# Patient Record
Sex: MALE | Race: WHITE | Employment: FULL TIME | URBAN - METROPOLITAN AREA
[De-identification: names, ages, dates, MRNs, and addresses within clinical notes are randomized per-mention and may not be internally consistent; named-entity substitution may affect disease eponyms.]

---

## 2020-02-04 ENCOUNTER — OFFICE VISIT (OUTPATIENT)
Dept: ORTHOPEDIC SURGERY | Age: 65
End: 2020-02-04
Payer: COMMERCIAL

## 2020-02-04 VITALS
HEIGHT: 68 IN | DIASTOLIC BLOOD PRESSURE: 76 MMHG | WEIGHT: 185 LBS | HEART RATE: 68 BPM | BODY MASS INDEX: 28.04 KG/M2 | SYSTOLIC BLOOD PRESSURE: 151 MMHG

## 2020-02-04 PROBLEM — N52.8 OTHER MALE ERECTILE DYSFUNCTION: Status: ACTIVE | Noted: 2019-07-31

## 2020-02-04 PROCEDURE — 99203 OFFICE O/P NEW LOW 30 MIN: CPT | Performed by: ORTHOPAEDIC SURGERY

## 2020-02-04 PROCEDURE — 20610 DRAIN/INJ JOINT/BURSA W/O US: CPT | Performed by: ORTHOPAEDIC SURGERY

## 2020-02-04 RX ORDER — MELOXICAM 15 MG/1
15 TABLET ORAL DAILY PRN
Qty: 30 TABLET | Refills: 2 | Status: SHIPPED | OUTPATIENT
Start: 2020-02-04

## 2020-02-04 RX ORDER — NIACIN 1000 MG
TABLET, EXTENDED RELEASE ORAL
COMMUNITY
Start: 2013-03-15

## 2020-02-04 RX ORDER — SILDENAFIL 50 MG/1
50 TABLET, FILM COATED ORAL
COMMUNITY
Start: 2020-01-30

## 2020-02-04 RX ORDER — LISINOPRIL 20 MG/1
10 TABLET ORAL DAILY
COMMUNITY
Start: 2019-02-14

## 2020-02-04 RX ORDER — GLUCOSAMINE SULFATE 500 MG
1500 CAPSULE ORAL
COMMUNITY

## 2020-02-04 RX ORDER — METHYLPREDNISOLONE ACETATE 40 MG/ML
80 INJECTION, SUSPENSION INTRA-ARTICULAR; INTRALESIONAL; INTRAMUSCULAR; SOFT TISSUE ONCE
Status: COMPLETED | OUTPATIENT
Start: 2020-02-04 | End: 2020-02-04

## 2020-02-04 RX ADMIN — METHYLPREDNISOLONE ACETATE 80 MG: 40 INJECTION, SUSPENSION INTRA-ARTICULAR; INTRALESIONAL; INTRAMUSCULAR; SOFT TISSUE at 15:38

## 2020-02-04 NOTE — LETTER
Physical Therapy Rehabilitation Referral    Patient Name:  Gilda Connell      YOB: 1955    Diagnosis:    1. Adhesive capsulitis of right shoulder    2. Right shoulder pain, unspecified chronicity        Precautions: No strengthening yet. [x] Evaluate and Treat    Post Op Instructions:  [] Continuous passive motion (CPM) [] Elbow ROM  [x] Exercise in plane of scapula  []  Strengthening     [] Pulley and instruction   [x] Home exercise program (copy to patient)   [] Sling when arm at risk  [] Sling or brace at all times   [] AAROM: Forward elevation to  140            [] AAROM: External rotation  To  40    [] Isometric external rotator strengthening [] AAROM: internal rotation: up the back  [] Isometric abductor strengthening  [] AAROM: Internal abduction   [] Isometric internal rotator strengthening [] AAROM: cross-body adduction             Stretching:     Strengthening:  [x] Four quadrant (FE, ER, IR, CBA)  [] Rotator cuff (ER, IR, Abd)  [] Forward Elevation    [] External Rotators     [] External Rotation    [] Internal Rotators  [] Internal Rotation: up/back   [] Abductors     [] Internal Rotation: supine in abduction  [] Sleeper Stretch    [] Flexors  [] Cross-body abduction    [] Extensors  [] Pendulum (FE, Abd/Add, cw/ccw)  [x] Scapular Stabilizers   [] Wall-walking (FE, Abd)        [x] Shoulder shrugs     [] Table slides (FE)                [x] Rhomboid pinch  [] Elbow (flex, ext, pron, sup)        [] Lat.  Pull downs     [] Medial epicondylitis program       [] Forward punch   [] Lateral epicondylitis program       [] Internal rotators     [] Progressive resistive exercises  [] Bench Press        [] Bench press plus  Activities:     [] Lateral pull-downs  [] Rowing     [] Progressive two-hand supine press  [] Stepper/Exercise bike   [] Biceps: curls/supination  [] Swimming  [] Water exercises    Modalities:     Return to Sport:  [x] Of Choice      [] Plyometrics

## 2020-02-04 NOTE — PROGRESS NOTES
[address] [facility]  Office Visit  RIGHT SHOULDER Pain  Date:  2020    Name:  Catracho Duncan  Address:  [place] [address]    :  1955      Age:   59 y.o.    SSN:  [id number]      Medical Record Number:  [id number]    Chief Complaint:    RIGHT shoulder pain with overhead activity  Frozen Shoulder    HPI:   Catracho Duncan is a 59 y.o. right hand dominant male presenting with right shoulder pain and stiffness. Pain began in 2019 when he was landing several buckets of asphalt across his driveway way. More recently he has noticed pain with overhead and abduction type movements of his right shoulder. He has no major pain at rest.  However he does some have some significant pain at night especially when he rolls on the shoulder but also irregardless of the position. Although there is no pain at rest there is pain with sudden movement especially reaching out to put his jacket on or to throw a seatbelt on. He has no neck pain no numbness paresthesias in the upper extremity. He has had no major prior injuries to his shoulder is had no formal physical therapy. He is tried aspirin, Tylenol, Aleve, and Advil for shoulder but nothing is because any consistent relief. He is had no prior cortisone shots or shoulder. Pain Assessment  Location of Pain: Shoulder  Location Modifiers: Right  Severity of Pain: 0  Quality of Pain: Sharp  Duration of Pain: Persistent  Frequency of Pain: Intermittent  Aggravating Factors:  Other (Comment)(raising arm)  Limiting Behavior: Yes  Result of Injury: No  Work-Related Injury: No  Are there other pain locations you wish to document?: No    Past History:  Past Medical History:   Diagnosis Date    Hyperlipidemia     Hypertension        Past Surgical History:   Procedure Laterality Date    HERNIA REPAIR      as child double       Social History     Tobacco Use    Smoking status: Never Smoker    Smokeless tobacco: Never Used   Substance Use Topics    Alcohol use: Yes    Drug use: Never        Family History:  family history is not on file. Current Outpatient Medications   Medication Sig Dispense Refill    Niacin ER 1000 MG TBCR 2 TABLETS DAILY      sildenafil (VIAGRA) 50 MG tablet Take 50 mg by mouth      Glucosamine 500 MG CAPS Take 1,500 mg by mouth      lisinopril (PRINIVIL;ZESTRIL) 20 MG tablet Take 10 mg by mouth daily       amLODIPine (NORVASC) 5 MG tablet Take 5 mg by mouth daily.  vitamin D 1000 UNITS CAPS Take 1 capsule by mouth daily.  FISH OIL 1,200 capsules by Does not apply route daily.  aspirin 81 MG tablet Take 81 mg by mouth daily. No current facility-administered medications for this visit. Allergies   Allergen Reactions    Statins      ELEVATED LIVER ENZYMES    Zocor [Simvastatin]      Causes elevated LFT         Review of Systems: A 14 point review of systems available in the scanned medical record on 2/4/2020 as documented by the patient. Today's review pertinent items are noted in HPI. Musculoskeletal ROS: positive for - pain in right shoulder      Physical Exam:  BP (!) 151/76   Pulse 68   Ht 5' 8\" (1.727 m)   Wt 185 lb (83.9 kg)   BMI 28.13 kg/m²       General: No acute distress, well nourished  CV: No obvious peripheral edema. Normal peripheral pulses  Neuro: Alert & oriented x 3  Psych: Normal mood and affect      RIGHT Upper Extremity Exam:      FF Abd ER IR   Active  100 30 sacrum   Passive  110 Supine in abduction 25deg Supine in abduction 5 deg   Strength (x/5)  5/5 Jobes  5/5 Champagne toast 5/5 5/5     Skin:  No skin rashes or lesions, warm, well perfused  Inspection: No deformity,  Palpation: TENDER at the biceps tendon, but not at the cuff insertion, nor AC joint, nor caleb-scap musculature. Stability: No instability.    Sensation: Intact in all distributions  Motor: AIN/PIN/U 5/5  Strong radial pulse  Special Tests: Positive Hawkin's Impingement. Negative Biceps, AC joint provocative testing. Load and shift very stiff less than contra-lateral side. Cross body adduction arm to contralateral ACJ distance is 12-14 cm. Contralateral left Upper Extremity Exam:      FF Abd ER IR   Active  175 45 T6   Passive ROM   Supine abduction 90deg Supine abduction 45 deg   Strength (x/5)  5/5 Jobes  5/5 Champagne toast 5/5 5/5     Skin:   No skin rashes or lesions, warm, well perfused  Inspection: No deformity,  Palpation: non tender at biceps, ac joint, nor cuff footprint  Stability: No instability  Sensation: Intact in all distributions  Motor: AIN/PIN/U 5/5  Strong radial pulse  Special Tests: . Load and shift normal grade . Cross body adduction arm to contralateral ACJ distance is 8-10cm    Radiographic:  3 xray views of the right  shoulder including True AP in internal and external and axillary lateral were reviewed and interpreted by me today and show a maintained gleno humeral joint space with no evidence of arthritis, loose body, or fracture. Normal AC joint. Assessment:  Sun Jose is a 59 y.o. male with primary idiopathic adhesive capsulitis of the right shoulder, of close 4 months duration. Impression:  Encounter Diagnosis   Name Primary?     Right shoulder pain, unspecified chronicity Yes       Office Procedures:  Orders Placed This Encounter   Procedures    XR SHOULDER RIGHT (MIN 2 VIEWS)     Standing Status:   Future     Number of Occurrences:   1     Standing Expiration Date:   2/4/2021     Order Specific Question:   Reason for exam:     Answer:   pain    [facility]     Referral Priority:   Routine     Referral Type:   Eval and Treat     Referral Reason:   Specialty Services Required     Requested Specialty:   Physical Therapy     Number of Visits Requested:   1    FL ARTHROCENTESIS ASPIR&/INJ MAJOR JT/BURSA W/O US     80 mg Depomedrol with 8 cc 1% Lidocaine in 10cc syringe with 25G (22G) my attention for an addendum. All efforts were made to ensure that this office note is accurate.       ______________  I was physically present and personally supervised the Orthopaedic Sports Medicine Fellow in the evaluation and development of a treatment plan for this patient. I personally interviewed the patient and performed a physical examination. In addition, I discussed the patient's condition and treatment options with them. I have also reviewed and agree with the past medical, family and social history unless otherwise noted. All of the patient's questions were answered. Ramsey Bee MD, PhD  2/4/2020

## 2020-02-06 ENCOUNTER — HOSPITAL ENCOUNTER (OUTPATIENT)
Dept: PHYSICAL THERAPY | Age: 65
Setting detail: THERAPIES SERIES
Discharge: HOME | End: 2020-02-06
Payer: COMMERCIAL

## 2020-02-06 PROCEDURE — 97161 PT EVAL LOW COMPLEX 20 MIN: CPT

## 2020-02-06 NOTE — PROGRESS NOTES
Outpatient Physical Therapy  [] [facility]    Phone: [phone]   Fax: [phone]   [] [facility]  Phone: [phone]              Fax: [phone]  [x] [facility]   Phone: [phone]   Fax: [phone]     To: Referring Practitioner: Dr. Shaun Luna      Patient: Ede Wells   : 1955   MRN: [id number]  Evaluation Date: 2020      Diagnosis Information:  · Diagnosis: R shoulder pain, unspecified chronicity (M25.511); Adhesive capsulitis of right shoulder (M75.01)   · Treatment Diagnosis: Decreased ADL status     Physical Therapy Certification/Re-Certification Form  Dear Dr. Rommel Oneal,  The following patient has been evaluated for physical therapy services and for therapy to continue, Medicare requires monthly physician review of the treatment plan. Please review the attached evaluation and/or summary of the patient's plan of care, and verify that you agree therapy should continue by signing the attached document and sending it back to our office. Plan of Care/Treatment to date:  [x] Therapeutic Exercise    [x] Modalities:  [] Therapeutic Activity     [] Ultrasound  [] Electrical Stimulation  [] Gait Training      [] Cervical Traction [] Lumbar Traction  [] Neuromuscular Re-education    [] Cold/hotpack [] Iontophoresis   [x] Instruction in HEP     Other:  [x] Manual Therapy      []             [] Aquatic Therapy      []           ? Frequency/Duration:  # Days per week: [] 1 day # Weeks: [] 1 week [] 5 weeks     [x] 2 days? [] 2 weeks [] 6 weeks     [] 3 days   [] 3 weeks [] 7 weeks     [] 4 days   [x] 4 weeks [] 8 weeks    Rehab Potential: [] Excellent [x] Good [] Fair  [] Poor       Electronically signed by:  Precious Werner PT      If you have any questions or concerns, please don't hesitate to call.   Thank you for your referral.      Physician Signature:________________________________Date:__________________  By signing above, therapists plan is approved by physician

## 2020-02-06 NOTE — PROGRESS NOTES
Physical Therapy  Initial Assessment  Date: 2020  Patient Name: Naun Bowen  MRN: [id number]  : 1955     Treatment Diagnosis: Decreased ADL status    Restrictions  Restrictions/Precautions  Restrictions/Precautions: Fall Risk(none)    Subjective   General  Chart Reviewed: Yes  Referring Practitioner: Dr. Ed Mcknight  Referral Date : 20  Diagnosis: R shoulder pain, unspecified chronicity (M25.511); Adhesive capsulitis of right shoulder (M75.01)  General Comment  Comments: Pt has difficulty lifting overhead \"to get something off a shelf. \"  Pt cannot sleep on his left side, cannot swing an ax, \"basically anything where I'm putting my arm up. \"  Pt cannot reach behind the back. Pt has 8/10 pain when reaching extremes of motion. Pt is able to lift items \"as long as I don't raise my arm. \"  PT Visit Information  Onset Date: 10/01/19  PT Insurance Information: BetoJason Arriaga 82 (submitted claim on 20)  Total # of Visits Approved: 12  Total # of Visits to Date: 1  Subjective  Subjective: Pt reported having R shoulder pain and dysfunction since , with incidious onset. Pt stated \"at the time I was working on my asphalt driveway and I was scrubbing it which may have triggered it. \"  Pain is rate 0-8/10.      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

## 2020-02-06 NOTE — FLOWSHEET NOTE
Physical Therapy Daily Treatment Note  Date:  2020    Patient Name:  Wallace Garnica    :  1955  MRN: [id number]    Restrictions/Precautions:  Restrictions/Precautions  Restrictions/Precautions: Fall Risk(none)  Pertinent Medical History:      Medical/Treatment Diagnosis Information:  · Diagnosis: R shoulder pain, unspecified chronicity (M25.511); Adhesive capsulitis of right shoulder (M75.01)  · Treatment Diagnosis: Decreased ADL status    Insurance/Certification information:  PT Insurance Information: BetoJason Arriaga 82 (submitted claim on 20)  Physician Information:  Referring Practitioner: Dr. Carmen Tran of care signed (Y/N):  Sent to in basket on 20    Visit# / total visits:    Pain level: 10     Functional Outcomes Measure:  Test:   Score:    Progress Note: []  Yes  []  No  Next due by: Visit #10      History of Injury: Pt reported having R shoulder pain and dysfunction since , with incidious onset. Pt stated \"at the time I was working on my asphalt driveway and I was scrubbing it which may have triggered it. \"  Pain is rate 0-8/10. Pt has difficulty lifting overhead \"to get something off a shelf. \"  Pt cannot sleep on his left side, cannot swing an ax, \"basically anything where I'm putting my arm up. \"  Pt cannot reach behind the back. Pt has 8/10 pain when reaching extremes of motion. Pt is able to lift items \"as long as I don't raise my arm. \"    Subjective:       Objective:   Observation:    Test measurements:     20  AROM RUE (degrees)  Shoulder, R 20   AROM    Flexion 125      IR 60   ER 48   Extension 35   IR behind back To L5   PROM    Flexion 138      IR 60   ER 52   Strength    Flex 3-/5   ABD 3-/5   IR 5/5   ER 5/5         Exercises:  Exercise/Equipment Resistance/Repetitions Other comments   Roll ball on table  Flexion  Scaption   30x  30x    Amity  Flexion add    Wall slides add    Ball on wall Not yet    Overhead coordination, kinesthetic sense, posture, motor skill, proprioception for self-care, mobility, lifting, and ambulation      Manual Treatments:  MFR / STM / Oscillations-Mobs:  G-I, II, III, IV / Manipulation / MLD  [] (91996) Provided manual therapy to mobilize  soft tissue/joints/fluid for the purpose of modulating pain, promoting relaxation, increasing ROM, reducing/eliminating soft tissue swelling/inflammation/restriction, improving soft tissue extensibility and allowing for proper ROM for normal function with self- care, mobility, lifting and ambulation. Timed Code Treatment Minutes: 25   Total Treatment Minutes: 55     [x] EVAL (LOW) 56627   [] EVAL (MOD) 11337   [] EVAL (HIGH) 96316   [] RE-EVAL   [] TE (25698) x     [] Aquatic (55794) x  [] NMR (12111)   x  [] Aquatic Group (03850) x  [] Manual (53252) x    [] Ultrasound (58542) x  [] TA (30719) x  [] Mech Traction (08186)  [] Ionto (62312)           [] ES (un) (74629):   [] Vasopump (89601) [] Other:      Assessment  [] Patient tolerated treatment well [] Patient limited by fatigue  [x] Patient limited by pain  [] Patient limited by other medical complications  [] Other:     Prognosis: [x] Good [] Fair  [] Poor    Goals:    Long term goals  Time Frame for Long term goals : 6 weeks  Long term goal 1: Pt will be independent with HEP. Long term goal 2: Pt will rate R shoulder pain 0-3/10. Long term goal 3: Pt will have 150 degrees flexion or more to help him reach overhead. Long term goal 4: Pt will no longer report waking due to shoulder pain. Long term goal 5: Pt will be able to wash his back with his R hand.      Patient Requires Follow-up: [] Yes  [] No    Plan:   [] Continue per plan of care [] Alter current plan (see comments)  [x] Plan of care initiated [] Hold pending MD visit [] Discharge    Plan for Next Session:  Focus ROM, begin PROM/AAROM, GH and scapular joint mobs    Electronically signed by:  Shreyas Magana PT

## 2020-02-07 ENCOUNTER — APPOINTMENT (OUTPATIENT)
Dept: PHYSICAL THERAPY | Age: 65
End: 2020-02-07
Payer: COMMERCIAL

## 2020-02-12 ENCOUNTER — HOSPITAL ENCOUNTER (OUTPATIENT)
Dept: PHYSICAL THERAPY | Age: 65
Setting detail: THERAPIES SERIES
Discharge: HOME | End: 2020-02-12
Payer: COMMERCIAL

## 2020-02-12 PROCEDURE — 97110 THERAPEUTIC EXERCISES: CPT

## 2020-02-14 ENCOUNTER — HOSPITAL ENCOUNTER (OUTPATIENT)
Dept: PHYSICAL THERAPY | Age: 65
Setting detail: THERAPIES SERIES
Discharge: HOME | End: 2020-02-14
Payer: COMMERCIAL

## 2020-02-14 PROCEDURE — 97110 THERAPEUTIC EXERCISES: CPT

## 2020-02-14 NOTE — PROGRESS NOTES
Outpatient Physical Therapy  [] [facility]    Phone: [phone]   Fax: [phone]   [] [facility]  Phone: [phone]   Fax: [phone]  [x] [facility]              Phone: [phone]   Fax: [phone]     Physical Therapy Progress/Discharge Note  Date: 2020        Patient Name: Stephany Yap                    :  1955                   MRN: [id number]     Restrictions/Precautions:  Restrictions/Precautions  Restrictions/Precautions: Fall Risk(none)  Pertinent Medical History:       Medical/Treatment Diagnosis Information:  · Diagnosis: R shoulder pain, unspecified chronicity (M25.511); Adhesive capsulitis of right shoulder (M75.01)  · Treatment Diagnosis: Decreased ADL status     Insurance/Certification information:  PT Insurance Information: Jim Weinstein Bart 82 (submitted claim on 20)  Physician Information:  Referring Practitioner: Dr. Asiya Cadet of care signed (Y/N):  Sent to in basket on 20     Visit# / total visits:  3/8  Pain level:     0 /10 most of the time  Time Period for Report:   20-20  Cancels/No-shows to date: 0     Plan of Care/Treatment to date:  [x] Therapeutic Exercise    [x] Modalities:  [] Therapeutic Activity     [] Ultrasound  [] Electrical Stimulation  [] Gait Training      [] Cervical Traction    [] Lumbar Traction  [] Neuromuscular Re-education  [] Cold/hotpack [] Iontophoresis  [x] Instruction in HEP      Other:  [x] Manual Therapy       []    [] Aquatic Therapy       []                          Significant Findings At Last Visit/Comments:    Subjective:     20 Pt stated the shoulder is \"way better. \"  Pt rated shoulder pain 5-6/10 \"if its really pushed\" at end range, normally 0/10. Pt stated he no longer has \"zing\" pain with normal ADLs. Pt can sleep on his L side. Pt stated he felt like he did not need any more of a formal PT program but would definitely continue with his HEP. Pt has been performing HEP regularly. Objective:   Observation:  Test: Quick Evon Hakim  Score: 2/6/20 30% dysfunction;  2/14/20 7% dysfunction   Test measurements:    2/14/20  R GH accessory motion: WFL  Shoulder, R 2/6/20 2/14/20   AROM       Flexion 125 170    170   IR 60 75   ER 48 75   Extension 35 72   IR behind back To L5 To T12   PROM       Flexion 138 178    178   IR 60 75   ER 52 78   Strength       Flex 3-/5 5/5   ABD 3-/5 5/5   IR 5/5 5/5   ER 5/5 5/5        Assessment:   Summary:  ROM improving, pain decreasing, function improving. Pt has been doing an excellent job in working with his HEP. Progression Towards Functional goals:  [x] Patient is progressing as expected towards functional goals listed. [] Progression is slowed due to complexities listed. [] Progression has been slowed due to co-morbidities. [] Plan just implemented, too soon to assess goals progression  [] Other:    Goals:  Long term goals  Time Frame for Long term goals : 6 weeks  Long term goal 1: Pt will be independent with HEP. Long term goal 2: Pt will rate R shoulder pain 0-3/10. Long term goal 3: Pt will have 150 degrees flexion or more to help him reach overhead. Long term goal 4: Pt will no longer report waking due to shoulder pain. Long term goal 5: Pt will be able to wash his back with his R hand.        Rehab Potential: [] Excellent [x] Good [] Fair  [] Poor     Goal Status:  [x] Achieved [] Partially Achieved  [] Not Achieved     Patient Status: [x] Pt is doing well and would like to perform HEP and test how he does independently. PT will hold treatment for a few weeks, and if PT does not hear back from pt then PT will DC at that point. If pt has a reoccurrence of stiffening/pain, then pt will schedule more sessions. Electronically signed by:  Sylvia Perry PT    If you have any questions or concerns, please don't hesitate to call.   Thank you for your referral.    Physician

## 2020-02-19 ENCOUNTER — APPOINTMENT (OUTPATIENT)
Dept: PHYSICAL THERAPY | Age: 65
End: 2020-02-19
Payer: COMMERCIAL

## 2020-02-21 ENCOUNTER — APPOINTMENT (OUTPATIENT)
Dept: PHYSICAL THERAPY | Age: 65
End: 2020-02-21
Payer: COMMERCIAL

## 2020-02-26 ENCOUNTER — APPOINTMENT (OUTPATIENT)
Dept: PHYSICAL THERAPY | Age: 65
End: 2020-02-26
Payer: COMMERCIAL

## 2020-02-28 ENCOUNTER — APPOINTMENT (OUTPATIENT)
Dept: PHYSICAL THERAPY | Age: 65
End: 2020-02-28
Payer: COMMERCIAL

## 2020-04-01 NOTE — PROGRESS NOTES
Outpatient Physical Therapy  [] [facility]    Phone: [phone]   Fax: [phone]   [] [facility]  Phone: [phone]   Fax: [phone]  [x] [facility]              Phone: [phone]   Fax: [phone]     Physical Therapy Progress/Discharge Note  Date: 2020        Patient Name: Danise Brunner                    :  1955                   MRN: [id number]     Restrictions/Precautions:  Restrictions/Precautions  Restrictions/Precautions: Fall Risk(none)  Pertinent Medical History:       Medical/Treatment Diagnosis Information:  · Diagnosis: R shoulder pain, unspecified chronicity (M25.511); Adhesive capsulitis of right shoulder (M75.01)  · Treatment Diagnosis: Decreased ADL status     Insurance/Certification information:  PT Insurance Information: BetoJason Arriaga 82 (submitted claim on 20)  Physician Information:  Referring Practitioner: Dr. Chely Gonzalez of care signed (Y/N):  Sent to in basket on 20     Visit# / total visits:  3/8  Pain level:     0 /10 most of the time  Time Period for Report:   20-20  Cancels/No-shows to date: 0     Plan of Care/Treatment to date:  [x] Therapeutic Exercise    [x] Modalities:  [] Therapeutic Activity     [] Ultrasound  [] Electrical Stimulation  [] Gait Training      [] Cervical Traction    [] Lumbar Traction  [] Neuromuscular Re-education  [] Cold/hotpack [] Iontophoresis  [x] Instruction in HEP      Other:  [x] Manual Therapy       []    [] Aquatic Therapy       []                          Significant Findings At Last Visit/Comments:    Subjective:     20 Pt stated the shoulder is \"way better. \"  Pt rated shoulder pain 5-6/10 \"if its really pushed\" at end range, normally 0/10. Pt stated he no longer has \"zing\" pain with normal ADLs. Pt can sleep on his L side. Pt stated he felt like he did not need any more of a formal PT program but would definitely continue with his HEP. Pt has been performing HEP regularly. Signature:________________________________Date:__________________  By signing above, therapists plan is approved by physician

## 2020-04-01 NOTE — PROGRESS NOTES
Outpatient Physical Therapy  [] [facility]    Phone: [phone]   Fax: [phone]   [] [facility]  Phone: [phone]   Fax: [phone]  [x] [facility]              Phone: [phone]   Fax: [phone]     Physical Therapy Progress/Discharge Note  Date: 2020        Patient Name: Bhaskar Patel                    :  1955                   MRN: [id number]     Restrictions/Precautions:  Restrictions/Precautions  Restrictions/Precautions: Fall Risk(none)  Pertinent Medical History:       Medical/Treatment Diagnosis Information:  · Diagnosis: R shoulder pain, unspecified chronicity (M25.511); Adhesive capsulitis of right shoulder (M75.01)  · Treatment Diagnosis: Decreased ADL status     Insurance/Certification information:  PT Insurance Information: [employer]scot[employer] (submitted claim on 20)  Physician Information:  Referring Practitioner: Dr. Vonnie Laguerre of care signed (Y/N):  Sent to in basket on 20     Visit# / total visits:  3/8  Pain level:     0 /10 most of the time  Time Period for Report:   20-20  Cancels/No-shows to date: 0     Plan of Care/Treatment to date:  [x] Therapeutic Exercise    [x] Modalities:  [] Therapeutic Activity     [] Ultrasound  [] Electrical Stimulation  [] Gait Training      [] Cervical Traction    [] Lumbar Traction  [] Neuromuscular Re-education  [] Cold/hotpack [] Iontophoresis  [x] Instruction in HEP      Other:  [x] Manual Therapy       []    [] Aquatic Therapy       []                          Significant Findings At Last Visit/Comments:    Subjective:     20 Pt stated the shoulder is \"way better. \"  Pt rated shoulder pain 5-6/10 \"if its really pushed\" at end range, normally 0/10. Pt stated he no longer has \"zing\" pain with normal ADLs. Pt can sleep on his L side. Pt stated he felt like he did not need any more of a formal PT program but would definitely continue with his HEP. Pt has been performing HEP regularly. Objective:   Observation:  Test: Quick Loretha Batty  Score: 2/6/20 30% dysfunction;  2/14/20 7% dysfunction   Test measurements:    2/14/20  R GH accessory motion: WFL  Shoulder, R 2/6/20 2/14/20   AROM       Flexion 125 170    170   IR 60 75   ER 48 75   Extension 35 72   IR behind back To L5 To T12   PROM       Flexion 138 178    178   IR 60 75   ER 52 78   Strength       Flex 3-/5 5/5   ABD 3-/5 5/5   IR 5/5 5/5   ER 5/5 5/5        Assessment:   Summary:  ROM improving, pain decreasing, function improving. Pt has been doing an excellent job in working with his HEP. Progression Towards Functional goals:  [x] Patient is progressing as expected towards functional goals listed. [] Progression is slowed due to complexities listed. [] Progression has been slowed due to co-morbidities. [] Plan just implemented, too soon to assess goals progression  [] Other:    Goals:  Long term goals  Time Frame for Long term goals : 6 weeks  Long term goal 1: Pt will be independent with HEP. Long term goal 2: Pt will rate R shoulder pain 0-3/10. Long term goal 3: Pt will have 150 degrees flexion or more to help him reach overhead. Long term goal 4: Pt will no longer report waking due to shoulder pain. Long term goal 5: Pt will be able to wash his back with his R hand.        Rehab Potential: [] Excellent [x] Good [] Fair  [] Poor     Goal Status:  [x] Achieved [] Partially Achieved  [] Not Achieved     Patient Status: [x] Pt is doing well and would like to perform HEP and test how he does independently. PT will hold treatment for a few weeks, and if PT does not hear back from pt then PT will DC at that point. If pt has a reoccurrence of stiffening/pain, then pt will schedule more sessions. Electronically signed by:  Precious Werner PT    If you have any questions or concerns, please don't hesitate to call.   Thank you for your referral.    Physician Signature:________________________________Date:__________________  By signing above, therapists plan is approved by physician

## 2020-04-01 NOTE — PROGRESS NOTES
Objective:   Observation:  Test: Quick [place] Kudo  Score: 2/6/20 30% dysfunction;  2/14/20 7% dysfunction   Test measurements:    2/14/20  R GH accessory motion: WFL  Shoulder, R 2/6/20 2/14/20   AROM       Flexion 125 170    170   IR 60 75   ER 48 75   Extension 35 72   IR behind back To L5 To T12   PROM       Flexion 138 178    178   IR 60 75   ER 52 78   Strength       Flex 3-/5 5/5   ABD 3-/5 5/5   IR 5/5 5/5   ER 5/5 5/5        Assessment:   Summary:  ROM improving, pain decreasing, function improving. Pt has been doing an excellent job in working with his HEP. Progression Towards Functional goals:  [x] Patient is progressing as expected towards functional goals listed. [] Progression is slowed due to complexities listed. [] Progression has been slowed due to co-morbidities. [] Plan just implemented, too soon to assess goals progression  [] Other:    Goals:  Long term goals  Time Frame for Long term goals : 6 weeks  Long term goal 1: Pt will be independent with HEP. Long term goal 2: Pt will rate R shoulder pain 0-3/10. Long term goal 3: Pt will have 150 degrees flexion or more to help him reach overhead. Long term goal 4: Pt will no longer report waking due to shoulder pain. Long term goal 5: Pt will be able to wash his back with his R hand.        Rehab Potential: [] Excellent [x] Good [] Fair  [] Poor     Goal Status:  [x] Achieved [] Partially Achieved  [] Not Achieved     Patient Status: [x] 2/14/20 Pt is doing well and would like to perform HEP and test how he does independently. PT will hold treatment for a few weeks, and if PT does not hear back from pt then PT will DC at that point. If pt has a reoccurrence of stiffening/pain, then pt will schedule more sessions. 4/1/20 PT did not hear from pt. Plan: discharge PT. Electronically signed by:  Annabel Patterson PT    If you have any questions or concerns, please don't hesitate to call.   Thank you for your